# Patient Record
Sex: FEMALE | Race: BLACK OR AFRICAN AMERICAN | NOT HISPANIC OR LATINO
[De-identification: names, ages, dates, MRNs, and addresses within clinical notes are randomized per-mention and may not be internally consistent; named-entity substitution may affect disease eponyms.]

---

## 2024-02-05 ENCOUNTER — TRANSCRIPTION ENCOUNTER (OUTPATIENT)
Age: 54
End: 2024-02-05

## 2024-02-05 NOTE — ASU PATIENT PROFILE, ADULT - FALL HARM RISK - UNIVERSAL INTERVENTIONS
Bed in lowest position, wheels locked, appropriate side rails in place/Call bell, personal items and telephone in reach/Instruct patient to call for assistance before getting out of bed or chair/Non-slip footwear when patient is out of bed/Juana Diaz to call system/Physically safe environment - no spills, clutter or unnecessary equipment/Purposeful Proactive Rounding/Room/bathroom lighting operational, light cord in reach

## 2024-02-05 NOTE — ASU PATIENT PROFILE, ADULT - NSICDXPASTMEDICALHX_GEN_ALL_CORE_FT
PAST MEDICAL HISTORY:  Asthma     Duodenitis     Esophageal reflux     GERD (gastroesophageal reflux disease)     H/O cardiac murmur     H/O hiatal hernia     HLD (hyperlipidemia)     HTN (hypertension)     Leiomyoma     Obesity

## 2024-02-05 NOTE — ASU PATIENT PROFILE, ADULT - CENTRAL VENOUS CATHETER
[] : The components of the vaccine(s) to be administered today are listed in the plan of care. The disease(s) for which the vaccine(s) are intended to prevent and the risks have been discussed with the caretaker.  The risks are also included in the appropriate vaccination information statements which have been provided to the patient's caregiver.  The caregiver has given consent to vaccinate.
[] : The components of the vaccine(s) to be administered today are listed in the plan of care. The disease(s) for which the vaccine(s) are intended to prevent and the risks have been discussed with the caretaker.  The risks are also included in the appropriate vaccination information statements which have been provided to the patient's caregiver.  The caregiver has given consent to vaccinate.
no

## 2024-02-06 ENCOUNTER — TRANSCRIPTION ENCOUNTER (OUTPATIENT)
Age: 54
End: 2024-02-06

## 2024-02-06 ENCOUNTER — OUTPATIENT (OUTPATIENT)
Dept: OUTPATIENT SERVICES | Facility: HOSPITAL | Age: 54
LOS: 1 days | Discharge: HOME | End: 2024-02-06
Payer: COMMERCIAL

## 2024-02-06 VITALS
HEIGHT: 64 IN | DIASTOLIC BLOOD PRESSURE: 94 MMHG | OXYGEN SATURATION: 97 % | WEIGHT: 215.39 LBS | SYSTOLIC BLOOD PRESSURE: 150 MMHG | RESPIRATION RATE: 16 BRPM | HEART RATE: 74 BPM | TEMPERATURE: 97 F

## 2024-02-06 VITALS
RESPIRATION RATE: 15 BRPM | OXYGEN SATURATION: 97 % | DIASTOLIC BLOOD PRESSURE: 91 MMHG | SYSTOLIC BLOOD PRESSURE: 162 MMHG | HEART RATE: 90 BPM | TEMPERATURE: 98 F

## 2024-02-06 DIAGNOSIS — Z98.891 HISTORY OF UTERINE SCAR FROM PREVIOUS SURGERY: Chronic | ICD-10-CM

## 2024-02-06 LAB — GLUCOSE BLDC GLUCOMTR-MCNC: 99 MG/DL — SIGNIFICANT CHANGE UP (ref 70–99)

## 2024-02-06 PROCEDURE — 88307 TISSUE EXAM BY PATHOLOGIST: CPT | Mod: 26

## 2024-02-06 PROCEDURE — 86901 BLOOD TYPING SEROLOGIC RH(D): CPT

## 2024-02-06 PROCEDURE — 82962 GLUCOSE BLOOD TEST: CPT

## 2024-02-06 PROCEDURE — 88304 TISSUE EXAM BY PATHOLOGIST: CPT | Mod: 26

## 2024-02-06 PROCEDURE — 86900 BLOOD TYPING SEROLOGIC ABO: CPT

## 2024-02-06 PROCEDURE — 58542 LSH W/T/O UT 250 G OR LESS: CPT

## 2024-02-06 PROCEDURE — 88307 TISSUE EXAM BY PATHOLOGIST: CPT

## 2024-02-06 PROCEDURE — 86850 RBC ANTIBODY SCREEN: CPT

## 2024-02-06 PROCEDURE — 88304 TISSUE EXAM BY PATHOLOGIST: CPT

## 2024-02-06 PROCEDURE — C1889: CPT

## 2024-02-06 DEVICE — SURGICEL POWDER 3 GRAMS
Type: IMPLANTABLE DEVICE | Status: NON-FUNCTIONAL
Removed: 2024-02-06

## 2024-02-06 RX ORDER — AMLODIPINE BESYLATE 2.5 MG/1
1 TABLET ORAL
Refills: 0 | DISCHARGE

## 2024-02-06 RX ORDER — METOCLOPRAMIDE HCL 10 MG
10 TABLET ORAL EVERY 6 HOURS
Refills: 0 | Status: DISCONTINUED | OUTPATIENT
Start: 2024-02-06 | End: 2024-02-06

## 2024-02-06 RX ORDER — ACETAMINOPHEN 500 MG
1000 TABLET ORAL EVERY 6 HOURS
Refills: 0 | Status: DISCONTINUED | OUTPATIENT
Start: 2024-02-06 | End: 2024-02-06

## 2024-02-06 RX ORDER — OXYCODONE HYDROCHLORIDE 5 MG/1
10 TABLET ORAL EVERY 4 HOURS
Refills: 0 | Status: DISCONTINUED | OUTPATIENT
Start: 2024-02-06 | End: 2024-02-06

## 2024-02-06 RX ORDER — KETOROLAC TROMETHAMINE 30 MG/ML
30 SYRINGE (ML) INJECTION EVERY 6 HOURS
Refills: 0 | Status: DISCONTINUED | OUTPATIENT
Start: 2024-02-06 | End: 2024-02-06

## 2024-02-06 RX ORDER — HYDROMORPHONE HYDROCHLORIDE 2 MG/ML
0.5 INJECTION INTRAMUSCULAR; INTRAVENOUS; SUBCUTANEOUS
Refills: 0 | Status: DISCONTINUED | OUTPATIENT
Start: 2024-02-06 | End: 2024-02-06

## 2024-02-06 RX ORDER — ONDANSETRON 8 MG/1
8 TABLET, FILM COATED ORAL EVERY 6 HOURS
Refills: 0 | Status: DISCONTINUED | OUTPATIENT
Start: 2024-02-06 | End: 2024-02-06

## 2024-02-06 RX ORDER — SIMETHICONE 80 MG/1
80 TABLET, CHEWABLE ORAL EVERY 6 HOURS
Refills: 0 | Status: DISCONTINUED | OUTPATIENT
Start: 2024-02-06 | End: 2024-02-06

## 2024-02-06 RX ORDER — SODIUM CHLORIDE 9 MG/ML
1000 INJECTION, SOLUTION INTRAVENOUS
Refills: 0 | Status: DISCONTINUED | OUTPATIENT
Start: 2024-02-06 | End: 2024-02-06

## 2024-02-06 RX ORDER — OXYCODONE HYDROCHLORIDE 5 MG/1
5 TABLET ORAL EVERY 4 HOURS
Refills: 0 | Status: DISCONTINUED | OUTPATIENT
Start: 2024-02-06 | End: 2024-02-06

## 2024-02-06 RX ORDER — HEPARIN SODIUM 5000 [USP'U]/ML
5000 INJECTION INTRAVENOUS; SUBCUTANEOUS ONCE
Refills: 0 | Status: COMPLETED | OUTPATIENT
Start: 2024-02-06 | End: 2024-02-06

## 2024-02-06 RX ORDER — CELECOXIB 200 MG/1
400 CAPSULE ORAL ONCE
Refills: 0 | Status: COMPLETED | OUTPATIENT
Start: 2024-02-06 | End: 2024-02-06

## 2024-02-06 RX ORDER — ACETAMINOPHEN 500 MG
1000 TABLET ORAL ONCE
Refills: 0 | Status: COMPLETED | OUTPATIENT
Start: 2024-02-06 | End: 2024-02-06

## 2024-02-06 RX ADMIN — CELECOXIB 400 MILLIGRAM(S): 200 CAPSULE ORAL at 10:04

## 2024-02-06 RX ADMIN — HYDROMORPHONE HYDROCHLORIDE 0.5 MILLIGRAM(S): 2 INJECTION INTRAMUSCULAR; INTRAVENOUS; SUBCUTANEOUS at 15:09

## 2024-02-06 RX ADMIN — HYDROMORPHONE HYDROCHLORIDE 0.5 MILLIGRAM(S): 2 INJECTION INTRAMUSCULAR; INTRAVENOUS; SUBCUTANEOUS at 15:24

## 2024-02-06 RX ADMIN — HEPARIN SODIUM 5000 UNIT(S): 5000 INJECTION INTRAVENOUS; SUBCUTANEOUS at 10:04

## 2024-02-06 RX ADMIN — Medication 30 MILLIGRAM(S): at 18:24

## 2024-02-06 RX ADMIN — Medication 1000 MILLIGRAM(S): at 10:03

## 2024-02-06 RX ADMIN — HYDROMORPHONE HYDROCHLORIDE 0.5 MILLIGRAM(S): 2 INJECTION INTRAMUSCULAR; INTRAVENOUS; SUBCUTANEOUS at 15:44

## 2024-02-06 RX ADMIN — Medication 1000 MILLIGRAM(S): at 16:22

## 2024-02-06 RX ADMIN — Medication 30 MILLIGRAM(S): at 18:01

## 2024-02-06 RX ADMIN — HYDROMORPHONE HYDROCHLORIDE 0.5 MILLIGRAM(S): 2 INJECTION INTRAMUSCULAR; INTRAVENOUS; SUBCUTANEOUS at 15:29

## 2024-02-06 NOTE — BRIEF OPERATIVE NOTE - OPERATION/FINDINGS
Open entry at umbilicus with gel port placed. Dense adhesions noted from anterior uterine wall to omentum. Two 10mm ports placed. Significant lysis of adhesions performed for >1 hr. 20-week size fibroid uterus visualized with normal fallopian tubes and ovaries. Supracervical hysterectomy and bilateral salpingectomy performed. Hemostasis excellent. Surgicel powder used for additional hemostasis. Specimens removed via bag and morcellated at umbilicus. Pelvis visualized again to confirm hemostasis. Fascia at all ports closed with vicryl suture. Skin closed with monocryl suture.

## 2024-02-06 NOTE — BRIEF OPERATIVE NOTE - NSICDXBRIEFPROCEDURE_GEN_ALL_CORE_FT
PROCEDURES:  Laparoscopic bilateral salpingectomy 06-Feb-2024 14:50:30  Rubi Herman  Laparoscopic supracervical hysterectomy for uterus over 250 grams 06-Feb-2024 14:56:38  Rubi Herman

## 2024-02-06 NOTE — PROGRESS NOTE ADULT - SUBJECTIVE AND OBJECTIVE BOX
GYN POC    Pt seen and examined at bedside. Pt complains of mild abdominal pain.   Pt denies any fever, chills, chest pain, SOB, nausea or vomiting.    T(F): 97.8 (02-06-24 @ 17:23), Max: 97.9 (02-06-24 @ 14:46)  HR: 93 (02-06-24 @ 17:23) (74 - 99)  BP: 153/86 (02-06-24 @ 17:23) (138/104 - 169/89)  RR: 18 (02-06-24 @ 17:23) (14 - 29)  SpO2: 97% (02-06-24 @ 17:23) (90% - 99%)  02-06 @ 07:01  -  02-06 @ 17:57  --------------------------------------------------------  IN: 2262 mL / OUT: 200 mL / NET: 2062 mL        acetaminophen     Tablet .. 1000 milliGRAM(s) Oral every 6 hours  HYDROmorphone  Injectable 0.5 milliGRAM(s) IV Push every 15 minutes PRN Severe Pain (7 - 10)  ketorolac   Injectable 30 milliGRAM(s) IV Push every 6 hours  lactated ringers. 1000 milliLiter(s) IV Continuous <Continuous>  metoclopramide Injectable 10 milliGRAM(s) IV Push every 6 hours PRN Nausea and/or Vomiting  ondansetron Injectable 8 milliGRAM(s) IV Push every 6 hours PRN Nausea and/or Vomiting  oxyCODONE    IR 5 milliGRAM(s) Oral every 4 hours PRN Moderate Pain (4 - 6)  oxyCODONE    IR 10 milliGRAM(s) Oral every 4 hours PRN Severe Pain (7 - 10)  simethicone 80 milliGRAM(s) Chew every 6 hours PRN Gas      Physical exam:  Constitutional: NAD  Pulmonary: no incr. WOB  Abdomen: incision sites clean, dry and intact, abdomen soft, mildly tender, moderately distended.  Extremities: no lower extremity edema, or calf tenderness w/ SCDs in places    A: 53yoF s/p L/s MICHELLE BS for 20wk sized uterus    Plan:  OK for discharge home once pt has voided  Pt already eating, voiding, ambulating, no N/V.

## 2024-02-06 NOTE — ASU DISCHARGE PLAN (ADULT/PEDIATRIC) - NS MD DC FALL RISK RISK
For information on Fall & Injury Prevention, visit: [email or web address] OR  [email or web address] OR  https://www.cdc.gov/steadi/patient.html

## 2024-02-06 NOTE — PRE-ANESTHESIA EVALUATION ADULT - NSANTHOSAYNRD_GEN_A_CORE
No. MELANY screening performed.  STOP BANG Legend: 0-2 = LOW Risk; 3-4 = INTERMEDIATE Risk; 5-8 = HIGH Risk

## 2024-02-06 NOTE — ASU DISCHARGE PLAN (ADULT/PEDIATRIC) - CARE PROVIDER_API CALL
Baljit Sarkar  Obstetrics and Gynecology  [address], [address]  [place] [address]  Phone: [phone]  Fax: [phone]  Follow Up Time:

## 2024-02-16 LAB — SURGICAL PATHOLOGY STUDY: SIGNIFICANT CHANGE UP

## 2024-04-11 PROBLEM — K29.80 DUODENITIS WITHOUT BLEEDING: Chronic | Status: ACTIVE | Noted: 2024-02-05

## 2024-04-11 PROBLEM — Z87.19 PERSONAL HISTORY OF OTHER DISEASES OF THE DIGESTIVE SYSTEM: Chronic | Status: ACTIVE | Noted: 2024-02-05

## 2024-04-11 PROBLEM — K21.9 GASTRO-ESOPHAGEAL REFLUX DISEASE WITHOUT ESOPHAGITIS: Chronic | Status: ACTIVE | Noted: 2024-02-05

## 2024-04-11 PROBLEM — J45.909 UNSPECIFIED ASTHMA, UNCOMPLICATED: Chronic | Status: ACTIVE | Noted: 2024-02-05

## 2024-04-11 PROBLEM — E66.9 OBESITY, UNSPECIFIED: Chronic | Status: ACTIVE | Noted: 2024-02-05

## 2024-04-11 PROBLEM — Z86.79 PERSONAL HISTORY OF OTHER DISEASES OF THE CIRCULATORY SYSTEM: Chronic | Status: ACTIVE | Noted: 2024-02-05

## 2024-04-11 PROBLEM — D21.9 BENIGN NEOPLASM OF CONNECTIVE AND OTHER SOFT TISSUE, UNSPECIFIED: Chronic | Status: ACTIVE | Noted: 2024-02-05

## 2024-04-12 ENCOUNTER — APPOINTMENT (OUTPATIENT)
Dept: NEUROSURGERY | Facility: CLINIC | Age: 54
End: 2024-04-12
Payer: COMMERCIAL

## 2024-04-12 VITALS
WEIGHT: 205 LBS | HEIGHT: 64 IN | OXYGEN SATURATION: 98 % | RESPIRATION RATE: 18 BRPM | HEART RATE: 98 BPM | DIASTOLIC BLOOD PRESSURE: 108 MMHG | SYSTOLIC BLOOD PRESSURE: 151 MMHG | BODY MASS INDEX: 35 KG/M2

## 2024-04-12 PROBLEM — Z00.00 ENCOUNTER FOR PREVENTIVE HEALTH EXAMINATION: Status: ACTIVE | Noted: 2024-04-12

## 2024-04-12 PROCEDURE — 99205 OFFICE O/P NEW HI 60 MIN: CPT

## 2024-04-12 RX ORDER — AMLODIPINE BESYLATE 5 MG/1
TABLET ORAL
Refills: 0 | Status: ACTIVE | COMMUNITY

## 2024-04-12 NOTE — PHYSICAL EXAM
[Person] : oriented to person [Place] : oriented to place [Time] : oriented to time [Motor Tone] : muscle tone was normal in all four extremities [Motor Strength] : muscle strength was normal in all four extremities [4] : L2 Iliopsoas 4/5 [5] : S1 ankle flexors 5/5

## 2024-04-12 NOTE — REASON FOR VISIT
[New Patient Visit] : a new patient visit [Referred By: _________] : Patient was referred by WILMA [Spouse] : spouse

## 2024-04-15 NOTE — HISTORY OF PRESENT ILLNESS
[de-identified] : 54 year old female started to experience right sided neck and shoulder pain starting in March after being in bed more than usual after a partial hysterectomy. She was treated for shingles but never had any lesions The pain has since improved. She denies any numbness/tingling/weakness in her neck/shoulders or BUE.  She had a fall from standing at [employer] 4/6 on her right side.  She is now complaining of right leg radiating pain through her buttocks, side of leg and anterior thigh. She has stocking glove numbness to her distal  She is taking ibuprofen and extra tylenol with no relief. She is now relying on a rolling walker due to cramping type pain in her right leg which is worse when she lies down.

## 2024-04-15 NOTE — DATA REVIEWED
[de-identified] : MRI 3-30-24 at R ild left convex cervicothoracic scoliosis with loss of the normal lordosis. 2. 1 cm in maximal diameter high T2 and STIR and low T1 well marginated lesion within the C6 cervical vertebral body. This has a nonaggressive appearance with a small rim on the T2-weighted sequence and likely represents a benign atypical hemangioma although a lesion of other etiology including a metastasis could not be excluded. Clinical correlation is recommended. 3. Mild discogenic degenerative changes and spondylosis, most prominent at C5-6 and C6-7. 4. Posterior disc bulge at the C3-4 level with annular tearing. 5. Left paracentral to posterolateral annular tear and slightly superiorly migrating disc herniation at the C4-5 level superimposed upon a posterior disc bulge with impingement and slight flattening of the left ventral aspect of the cord. 6. Right posterolateral annular tear and disc herniation at the C5-6 level superimposed upon a posterior disc bulge with impingement and mild flattening of the ventral aspect of the cord favoring the right side. 7. Shallow broad-based posterior disc herniation at the C6-7 level most prominent in a left posterolateral location with some annular tearing. 8. Posterior disc bulge at the C7-T1 lev

## 2024-04-15 NOTE — ASSESSMENT
[FreeTextEntry1] : 54 year old female with C6 vertebral body lesion and RLE radiating pain.    MRI cervical spine with and without contrast in 3 months (June 2024) to better evaluate C6 lesion MRI lumbar spine without contrast to evaluate RLE radiculopathy RTO after MRI lumbar spine Start gabapentin 300mg PO TID start PT   I, Dr. Duckworth, personally performed the evaluation and management (E/M) services for this new patient.  That E/M includes conducting the initial examination, assessing all conditions, and establishing the plan of care.  Today, my ACP, Yvette Lewis, was here to observe my evaluation and management services for this patient to be followed going forward.    Patient and patient's family verbalize agreement and understanding with plan of care.

## 2024-04-15 NOTE — ADDENDUM
[FreeTextEntry1] : Neck pain resolved but incidental C6 vertebral body lesion noted, will need followup MRI C-spine in 3 months. Patient now has new right leg radiculopathy and back pain after fall. Will need lumbar MRI to rule out traumatic herniated disk. Followup after MRI done.

## 2024-04-20 ENCOUNTER — APPOINTMENT (OUTPATIENT)
Dept: MRI IMAGING | Facility: CLINIC | Age: 54
End: 2024-04-20
Payer: COMMERCIAL

## 2024-04-20 ENCOUNTER — OUTPATIENT (OUTPATIENT)
Dept: OUTPATIENT SERVICES | Facility: HOSPITAL | Age: 54
LOS: 1 days | End: 2024-04-20

## 2024-04-20 DIAGNOSIS — Z98.891 HISTORY OF UTERINE SCAR FROM PREVIOUS SURGERY: Chronic | ICD-10-CM

## 2024-04-20 PROCEDURE — 72158 MRI LUMBAR SPINE W/O & W/DYE: CPT | Mod: 26

## 2024-04-30 ENCOUNTER — NON-APPOINTMENT (OUTPATIENT)
Age: 54
End: 2024-04-30

## 2024-05-02 PROBLEM — M48.062 LUMBAR STENOSIS WITH NEUROGENIC CLAUDICATION: Status: ACTIVE | Noted: 2024-05-02

## 2024-05-02 PROBLEM — M54.16 LUMBAR RADICULOPATHY, ACUTE: Status: ACTIVE | Noted: 2024-04-12

## 2024-05-02 PROBLEM — D18.09 VERTEBRAL BODY HEMANGIOMA: Status: ACTIVE | Noted: 2024-04-12

## 2024-05-02 PROBLEM — I10 HIGH BLOOD PRESSURE: Status: RESOLVED | Noted: 2024-05-02 | Resolved: 2024-05-02

## 2024-05-02 PROBLEM — Z78.9 CURRENT NON-SMOKER: Status: ACTIVE | Noted: 2024-05-02

## 2024-05-06 ENCOUNTER — NON-APPOINTMENT (OUTPATIENT)
Age: 54
End: 2024-05-06

## 2024-05-06 ENCOUNTER — APPOINTMENT (OUTPATIENT)
Dept: NEUROSURGERY | Facility: CLINIC | Age: 54
End: 2024-05-06
Payer: COMMERCIAL

## 2024-05-06 VITALS
DIASTOLIC BLOOD PRESSURE: 99 MMHG | BODY MASS INDEX: 35.51 KG/M2 | SYSTOLIC BLOOD PRESSURE: 155 MMHG | OXYGEN SATURATION: 98 % | RESPIRATION RATE: 18 BRPM | HEIGHT: 64 IN | WEIGHT: 208 LBS | HEART RATE: 76 BPM

## 2024-05-06 DIAGNOSIS — D18.09 HEMANGIOMA OF OTHER SITES: ICD-10-CM

## 2024-05-06 DIAGNOSIS — Z78.9 OTHER SPECIFIED HEALTH STATUS: ICD-10-CM

## 2024-05-06 DIAGNOSIS — I10 ESSENTIAL (PRIMARY) HYPERTENSION: ICD-10-CM

## 2024-05-06 DIAGNOSIS — M54.16 RADICULOPATHY, LUMBAR REGION: ICD-10-CM

## 2024-05-06 DIAGNOSIS — M48.062 SPINAL STENOSIS, LUMBAR REGION WITH NEUROGENIC CLAUDICATION: ICD-10-CM

## 2024-05-06 PROCEDURE — 99215 OFFICE O/P EST HI 40 MIN: CPT

## 2024-05-06 NOTE — ASSESSMENT
[FreeTextEntry1] : Images reviewed with the patient which showed moderate DDD @ L4-5 with foraminal stenosis as well as severe disc disease @ L5-S1 causing bilateral severe foraminal stenosis. Neuro exam is unremarkable at this time. No neurosurgical intervention is recommended at this time.  PLAN - physical therapy - pain management for injection trials - f/u in June after completing cervical MRI wo

## 2024-05-06 NOTE — REVIEW OF SYSTEMS
[Numbness] : numbness [Difficulty Walking] : difficulty walking [As Noted in HPI] : as noted in HPI [Negative] : Genitourinary

## 2024-05-06 NOTE — REASON FOR VISIT
[Follow-Up: _____] : a [unfilled] follow-up visit [FreeTextEntry1] : known cervical vertebral hemangioma and lumbar radiculopathy returns to review MRI L-spine.

## 2024-05-06 NOTE — HISTORY OF PRESENT ILLNESS
[de-identified] : 4/12/2024 INITIAL visit 54 year old female started to experience right sided neck and shoulder pain starting in March after being in bed more than usual after a partial hysterectomy. She was treated for shingles but never had any lesions The pain has since improved. She denies any numbness/tingling/weakness in her neck/shoulders or BUE.  She had a fall from standing at [employer] 4/6 on her right side.  She is now complaining of right leg radiating pain through her buttocks, side of leg and anterior thigh. She has stocking glove numbness to her distal  She is taking ibuprofen and extra tylenol with no relief. She is now relying on a rolling walker due to cramping type pain in her right leg which is worse when she lies down.  Plan was made to repeat MRI C-spine w/wo in 3 months and MRI L-spine asap to evaluate RLE radiculopathy [FreeTextEntry1] : Today she reports progressively worsening lower back to RLE pain. Pain is described as severe constant on her R side lower back radiating to R buttock/hip/lateral thigh/shin worsening by sitting but able to ambulates with walker. She noticed numbness on the same area which has been slightly improving with gabapentin 300mg TID.  She reports mild weakness on RLE causing some gait disturbance but denies saddle anesthesia, BB dysfunction.

## 2024-05-06 NOTE — DATA REVIEWED
[de-identified] : Lspine wo on 4/20/2024 in PACS EXAM: [id number] - MR SPINE LUMBAR WAW IC  - ORDERED BY: AVE BLAKE   PROCEDURE DATE:  04/20/2024    INTERPRETATION:  CLINICAL INFORMATION: Right lower extremity radiculopathy. Back pain.  ADDITIONAL CLINICAL INFORMATION: Low back muscle strain S39.012A  TECHNIQUE: Multiplanar, multisequence MRI was performed of the lumbar spine. IV Contrast: Gadavist  10 cc administered   0 cc discarded  PRIOR STUDIES: None.  FINDINGS:  LOCALIZER: No additional findings. BONES: Multiple osseous hemangiomata are seen. There is osseous edema about the right L4-L5 facet joint likely related to osseous stress reaction secondary to arthrosis. There is irregularity along the inferior endplate of L5 and the superior endplate of S1 which appears largely chronic. Small fluid is seen within the L5-S1 disc space. This is likely degenerative in nature, however correlation with ESR and CRP can be performed to exclude any possibility of developing infection. Mild fatty endplate changes are seen at L5-S1. ALIGNMENT: There is levoscoliosis of the lumbar spine. Lumbar lordosis is maintained. There is trace degenerative grade 1 anterolisthesis of L4 on L5. SACROILIAC JOINTS/SACRUM: There is moderate bilateral sacroiliac joint arthrosis. CONUS AND CAUDA EQUINA: The distal cord and conus are normal in signal. Conus terminates at L1. No abnormal intramedullary or leptomeningeal enhancement. VISUALIZED INTRAPELVIC/INTRA-ABDOMINAL SOFT TISSUES: Partially imaged bilateral renal cysts. The largest is on the right measuring approximately 2.8 x 1.8 cm. PARASPINAL SOFT TISSUES: Fatty infiltration of the posterior paraspinal musculature.   INDIVIDUAL LEVELS:  LOWER THORACIC SPINE: No spinal canal or neuroforaminal stenosis.  L1-L2: Bilateral facet arthrosis. No spinal canal or neural foraminal narrowing. L2-L3: Bilateral facet arthrosis. Mild diffuse disc bulge. Mild flattening of the ventral thecal sac. Mild bilateral neural foraminal narrowing. L3-L4: Minimal disc bulge. Bilateral facet arthrosis. Mild bilateral neural foraminal narrowing. No central canal narrowing. L4-L5: Mild diffuse disc bulge. Superimposed right foraminal disc protrusion. Findings result in moderate right neural foraminal narrowing with mass effect upon the exiting right L4 nerve root. There is mild right lateral recess narrowing. There is no left-sided neural foraminal narrowing. L5-S1: Posterior osseous ridging with a diffuse disc bulge. Bilateral facet arthrosis. Findings result in flattening of the ventral thecal sac. There is moderate to severe left and moderate right neural foraminal narrowing.   IMPRESSION:  Multilevel lumbar spondylosis.  L4-L5: Right foraminal disc protrusion resulting in moderate right neural foraminal narrowing with mass effect upon the exiting right L4 nerve root. Mild right lateral recess narrowing. Mild osseous stress reaction involving the right L4-L5 facet joint. Trace degenerative grade 1 anterolisthesis of L4 on L5.  L5-S1: Chronic appearing endplate irregularity. Small fluid in the disc space. Findings are likely degenerative in nature. Correlate with ESR and CRP to exclude any possibility of developing infection. Moderate to severe left and moderate right neural foraminal narrowing.  --- End of Report ---       YUMIKO HANDLEY MD; Attending Radiologist This document has been electronically signed. Apr 29 2024 12:32PM

## 2024-05-06 NOTE — PHYSICAL EXAM
[General Appearance - Alert] : alert [General Appearance - In No Acute Distress] : in no acute distress [General Appearance - Well Nourished] : well nourished [General Appearance - Well-Appearing] : healthy appearing [Oriented To Time, Place, And Person] : oriented to person, place, and time [Impaired Insight] : insight and judgment were intact [Affect] : the affect was normal [Memory Recent] : recent memory was not impaired [5] : S1 flexor hallucis longus 5/5 [Abnormal Walk] : normal gait [Limited Balance] : the patient's balance was impaired [Straight-Leg Raise Test - Right] : straight leg raise of the right leg was positive [Straight-Leg Raise Test - Left] : straight leg raise of the left leg was negative

## 2025-01-22 ENCOUNTER — NON-APPOINTMENT (OUTPATIENT)
Age: 55
End: 2025-01-22

## (undated) DEVICE — SEALER TISS ENSEAL CRVD X1 35CM

## (undated) DEVICE — WECK EFX SHIELD FASCIAL CLOSURE SYSTEM

## (undated) DEVICE — D HELP - CLEARVIEW CLEARIFY SYSTEM

## (undated) DEVICE — SPONGE ENDO PEANUT 5MM

## (undated) DEVICE — ENDOCATCH II 15MM

## (undated) DEVICE — FOLEY TRAY 16FR 5CC LF UMETER CLOSED

## (undated) DEVICE — TROCAR COVIDIEN VERSAONE FIXATION CANNULA 12MM

## (undated) DEVICE — WARMING BLANKET UPPER ADULT

## (undated) DEVICE — PREP CHLOROHEXIDINE 4% 118CC KIT

## (undated) DEVICE — Device

## (undated) DEVICE — APPLICATOR SURGICEL LAP TROCAR POINT 2.5MM X 150MM

## (undated) DEVICE — INSUFFLATION NDL COVIDIEN SURGINEEDLE VERESS 120MM

## (undated) DEVICE — SOL IRR BAG NS 0.9% 3000ML

## (undated) DEVICE — DRSG DERMABOND 0.7ML

## (undated) DEVICE — PACK GYN WDC

## (undated) DEVICE — POSITIONER PINK PAD PIGAZZI SYSTEM XL W ARM PROTECTOR

## (undated) DEVICE — TIP METZENBAUM SCISSOR MONOPOLAR ENDOCUT (ORANGE)

## (undated) DEVICE — SHEARS HARMONIC HD 1000I 5MM X 36CM CURVED TIP

## (undated) DEVICE — NDL HYPO SAFE 22G X 1.5" (BLACK)

## (undated) DEVICE — NDL GRASPING DISP

## (undated) DEVICE — SHEARS HARMONIC 1100 5MM X 36CM CURVED TIP

## (undated) DEVICE — GLV 8 PROTEXIS (WHITE)

## (undated) DEVICE — ENDOCATCH 10MM SPECIMEN POUCH

## (undated) DEVICE — TUBING STRYKER PNEUMOCLEAR SMOKE EVACUATION HIGH FLOW

## (undated) DEVICE — PREP CHLORAPREP HI-LITE ORANGE 26ML

## (undated) DEVICE — SUT PASSER SYMMETRY OR PRO PUNCTURE CLOSURE DEVICE

## (undated) DEVICE — VENODYNE/SCD SLEEVE CALF MEDIUM

## (undated) DEVICE — TROCAR GELPOINT MINI ADVANCED

## (undated) DEVICE — TROCAR COVIDIEN VERSAPORT BLADELESS OPTICAL 12MM STANDARD

## (undated) DEVICE — POSITIONER FOAM EGG CRATE ULNAR 2PCS (PINK)

## (undated) DEVICE — SUT VICRYL 0 27" UR-6

## (undated) DEVICE — SEALER TISS ENSEAL STR X1 35CM

## (undated) DEVICE — GOWN TRIMAX XXL

## (undated) DEVICE — SUT MONOCRYL 4-0 27" PS-2 UNDYED

## (undated) DEVICE — LUBRICANT INST ELECTROLUBE Z SOLUTION